# Patient Record
Sex: MALE | URBAN - METROPOLITAN AREA
[De-identification: names, ages, dates, MRNs, and addresses within clinical notes are randomized per-mention and may not be internally consistent; named-entity substitution may affect disease eponyms.]

---

## 2024-03-04 ENCOUNTER — TELEPHONE (OUTPATIENT)
Dept: NEUROLOGY | Facility: CLINIC | Age: 54
End: 2024-03-04

## 2024-03-04 NOTE — TELEPHONE ENCOUNTER
Patient is currently covered under   [employer]    ID # [id number] Effective 1/1/23    Patient is getting a referral to send to us from their primary doctor

## 2024-03-20 ENCOUNTER — TELEPHONE (OUTPATIENT)
Dept: NEUROLOGY | Facility: CLINIC | Age: 54
End: 2024-03-20

## 2024-03-22 NOTE — TELEPHONE ENCOUNTER
Recd  3/20 9:13 AM     I'm returning the call. my name is kathryn guzmán.  1970. And today I'm calling you because I'm returning your call. I couldn't answer the phone that quick.  Could you call me back [phone]

## 2024-03-25 ENCOUNTER — TELEPHONE (OUTPATIENT)
Dept: NEUROLOGY | Facility: CLINIC | Age: 54
End: 2024-03-25

## 2024-03-25 ENCOUNTER — OFFICE VISIT (OUTPATIENT)
Dept: NEUROLOGY | Facility: CLINIC | Age: 54
End: 2024-03-25
Payer: COMMERCIAL

## 2024-03-25 VITALS
OXYGEN SATURATION: 100 % | DIASTOLIC BLOOD PRESSURE: 80 MMHG | TEMPERATURE: 98.2 F | HEART RATE: 104 BPM | SYSTOLIC BLOOD PRESSURE: 124 MMHG

## 2024-03-25 DIAGNOSIS — G60.9 HEREDITARY PERIPHERAL NEUROPATHY: Primary | ICD-10-CM

## 2024-03-25 PROBLEM — G62.9 PERIPHERAL NEUROPATHY: Status: ACTIVE | Noted: 2024-03-25

## 2024-03-25 PROCEDURE — 99204 OFFICE O/P NEW MOD 45 MIN: CPT | Performed by: PSYCHIATRY & NEUROLOGY

## 2024-03-25 RX ORDER — APIXABAN 5 MG/1
5 TABLET, FILM COATED ORAL 2 TIMES DAILY
COMMUNITY

## 2024-03-25 RX ORDER — AMLODIPINE BESYLATE 5 MG/1
5 TABLET ORAL DAILY
COMMUNITY

## 2024-03-25 NOTE — PROGRESS NOTES
"Please note: this note was created with voice recognition software. Occasional wrong word or \"sound alike\" substitutions may occur due to the inherent limitations of voice recognition software. Read the chart carefully and recognize (using context) where substitutions may have occurred. I am available to discuss any questions.       {There are no diagnoses linked to this encounter. (Refresh or delete this SmartLink)}    Problem List Items Addressed This Visit    None      Problem List    None        I had the pleasure of seeing Dk Lau, a 53 y.o. male, for neuromuscular consultation. The referral was for ***.     ***      No past medical history on file.    No past surgical history on file.    Patient has no allergy information on record.    Social History     Tobacco Use   Smoking Status Not on file   Smokeless Tobacco Not on file       Social History     Substance and Sexual Activity   Alcohol Use Not on file       Social History     Substance and Sexual Activity   Drug Use Not on file       No family history on file.    No current outpatient medications on file.    No results found for any previous visit.        No results found for this or any previous visit.     No results found for this or any previous visit.    No results found for this or any previous visit.    No results found for this or any previous visit.    No results found for this or any previous visit.    No results found for this or any previous visit.    No results found for this or any previous visit.    No results found for this or any previous visit.    No results found for this or any previous visit.    No results found for this or any previous visit.    No results found for this or any previous visit.    No results found for this or any previous visit.      Review of Systems   Constitutional:  Negative for appetite change, fatigue and fever.   HENT: Negative.  Negative for hearing loss, tinnitus, trouble swallowing and voice change.  "   Eyes: Negative.  Negative for photophobia, pain and visual disturbance.   Respiratory: Negative.  Negative for shortness of breath.    Cardiovascular: Negative.  Negative for palpitations.   Gastrointestinal: Negative.  Negative for nausea and vomiting.   Endocrine: Negative.  Negative for cold intolerance.   Genitourinary: Negative.  Negative for dysuria, frequency and urgency.   Musculoskeletal:  Negative for back pain, gait problem, myalgias, neck pain and neck stiffness.   Skin: Negative.  Negative for rash.   Allergic/Immunologic: Negative.    Neurological:  Positive for weakness (weakness in hands/arms). Negative for dizziness, tremors, seizures, syncope, facial asymmetry, speech difficulty, light-headedness, numbness and headaches.   Hematological: Negative.  Does not bruise/bleed easily.   Psychiatric/Behavioral: Negative.  Negative for confusion, hallucinations and sleep disturbance.          On examination,     There were no vitals taken for this visit.    Well developed, well nourished, in no acute distress    Normocephalic, atraumatic    Heart: regular rate and rhythm  I did not hear a carotid bruit    Extremities: no clubbing, cyanosis, or edema    Speech and cognition appeared normal    Cranial nerves:  II: Pupils equal, round, and reactive to light. No gross visual field defect. I did not appreciate optic disc edema.  III, IV, VI: Extraocular movements intact  V: Normal facial sensation in all three divisions of the trigeminal nerve bilaterally  VII: Normal facial strength  VIII: Hearing intact to finger rub bilaterally  IX, X: Palate elevated symmetrically  XI: Sternocleidomastoid strength normal bilaterally  XII: Tongue protruded in midline without atrophy or fibrillations    Motor:  Normal tone and bulk throughout.   Muscle strength testing by the MRC scale was 5/5 in the deltoid, biceps, triceps, wrist extensors, wrist flexors, finger extensors, finger flexors,. Hip flexors, quadriceps, ankle  dorsiflexors, ankle plantar flexors, and EHL bilaterally    Deep tendon reflexes:   2+ and symmetrical in the biceps, triceps, brachioradialis, patellas, and ankles  Toes downgoing (no Babinski sign)  No Pantoja's sign    Sensation: Normal pinprick and light touch throughout    Cerebellar: normal finger to nose and heel to shin testing    Gait: Normal heel, toe, and tandem gait            There are no Patient Instructions on file for this visit.      Thank you very much for allowing me to participate in your patient's care. Please feel free to contact me for any questions or concerns. Please be aware of the inherent limitations of voice recognition software, which may result in transcriptional errors.    Percy Palacio MD

## 2024-03-25 NOTE — ASSESSMENT & PLAN NOTE
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  Genetic testing in the past was negative but does not exclude a point mutation, especially if his neuropathy is axonal (which testing has suggested).  No family members are available for examination and I told him and his caregiver I would be happy to see them at some point.  However, based on his description it sounds like his daughter, his mother, and a cousin are all affected.  The onset of his symptoms was in the teens with progressive weakness since then without sensory complaints.  This presentation is quite typical for a hereditary neuropathy.  Although his perception is that he improved when he received rituximab, this is based on the fact that he has progressed since stopping the medication.  No doctor has documented objective improvement despite 15 years of monthly IVIG, and despite other treatments including steroids and Rituxan.  I told him that I believe that the potential risks of treatment with immune modulatory or immunosuppressive treatment outweighs the potential benefits.  I am not entirely sure that he understood or agreed.  I certainly think he is a good candidate for physiatry evaluation given his chronic deficits and have given him a referral.  I will also write for a home physical therapy evaluation.

## 2024-03-25 NOTE — TELEPHONE ENCOUNTER
Wil  3/20 12:39 PM    [facility] [phone]  __________  patient already arrived for visit 3/25 with Dr. Palacio

## 2024-03-25 NOTE — PROGRESS NOTES
"Please note: this note was created with voice recognition software. Occasional wrong word or \"sound alike\" substitutions may occur due to the inherent limitations of voice recognition software. Read the chart carefully and recognize (using context) where substitutions may have occurred. I am available to discuss any questions.       1. 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  Genetic testing in the past was negative but does not exclude a point mutation, especially if his neuropathy is axonal (which testing has suggested).  No family members are available for examination and I told him and his caregiver I would be happy to see them at some point.  However, based on his description it sounds like his daughter, his mother, and a cousin are all affected.  The onset of his symptoms was in the teens with progressive weakness since then without sensory complaints.  This presentation is quite typical for a hereditary neuropathy.  Although his perception is that he improved when he received rituximab, this is based on the fact that he has progressed since stopping the medication.  No doctor has documented objective improvement despite 15 years of monthly IVIG, and despite other treatments including steroids and Rituxan.  I told him that I believe that the potential risks of treatment with immune modulatory or immunosuppressive treatment outweighs the potential benefits.  I am not entirely sure that he understood or agreed.  I certainly think he is a good candidate for physiatry evaluation given his chronic deficits and have given him a referral.  I will also write for a home physical therapy evaluation.    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  Genetic testing " in the past was negative but does not exclude a point mutation, especially if his neuropathy is axonal (which testing has suggested).  No family members are available for examination and I told him and his caregiver I would be happy to see them at some point.  However, based on his description it sounds like his daughter, his mother, and a cousin are all affected.  The onset of his symptoms was in the teens with progressive weakness since then without sensory complaints.  This presentation is quite typical for a hereditary neuropathy.  Although his perception is that he improved when he received rituximab, this is based on the fact that he has progressed since stopping the medication.  No doctor has documented objective improvement despite 15 years of monthly IVIG, and despite other treatments including steroids and Rituxan.  I told him that I believe that the potential risks of treatment with immune modulatory or immunosuppressive treatment outweighs the potential benefits.  I am not entirely sure that he understood or agreed.  I certainly think he is a good candidate for physiatry evaluation given his chronic deficits and have given him a referral.  I will also write for a home physical therapy evaluation.         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  Genetic testing in the past was negative but does not exclude a point mutation, especially if his neuropathy is axonal (which testing has suggested).  No family members are available for examination and I told him and his caregiver I would be happy to see them at some point.  However, based on his description it sounds like his daughter, his mother, and a cousin are all affected.  The onset of his symptoms was in the teens with progressive weakness since then without sensory complaints.   "This presentation is quite typical for a hereditary neuropathy.  Although his perception is that he improved when he received rituximab, this is based on the fact that he has progressed since stopping the medication.  No doctor has documented objective improvement despite 15 years of monthly IVIG, and despite other treatments including steroids and Rituxan.  I told him that I believe that the potential risks of treatment with immune modulatory or immunosuppressive treatment outweighs the potential benefits.  I am not entirely sure that he understood or agreed.  I certainly think he is a good candidate for physiatry evaluation given his chronic deficits and have given him a referral.  I will also write for a home physical therapy evaluation.              I had the pleasure of seeing Dk Lau, a 53 y.o. male, for neuromuscular consultation. The referral was for neuropathy.     He initially developed weakness in his hands in his teenage years. He had carpal tunnel surgery, without improvement. At age 20, he started falling and dropping to his knees. He then began to notice proximal leg weakness. He was initially diagnosed with CIDP in 2005 and was treated with monthly IVIg for 15 years, but has continued to progress. He received intermittent \"stem cell\" treatments in [place] but stopped in 2019 without improvement. He received rituximab and IV steroids from 2231-6282 and reported improvement after induction dose followed by 1000 mg every 6 months. He established care at [place] in July 2021. He has had progressive weakness and transitioned to a power wheelchair from a walker in May 2021     He had genetic panel with [facility] in 2021 that was negative. Previous work up [facility] includes EMG done 3/24 showing severe axonal neuropathy. EMG results done by Dr. Suárez 9/2021 not consistent with CIDP, rather severe polyneuropathy.  MRI of the entire neuroaxis in 7/2021 demonstrating \"diffuse atrophy, not " "expected with CIDP\" done at [place].  His most recent EMG was performed at [facility] which showed a severe axonal generalized neuropathy.  His last EMG at [place] was \"not consistent with CIDP\".  He cannot hold objects because his fingers \"curl up\".  He is interested in starting Rituxan because of his progression since he stopped this medicine.      He has a son who is 20 years old who is reported to be in normal health.  A daughter who is 28 has \"a little bit of problems\".  She has been dropping objects for 3 years and was never able to run track in school.  His mother has \"problems with her hands\" and drops things.  A cousin on that side is apparently affected and wears braces on his legs.    Mr. Lau is on Eliquis because of a recent DVT.  He discloses a history of asthma.  There is a history of carpal tunnel release as well as a remote muscle biopsy although those results are not available for my review.  There is no history of smoking or substance abuse.    No past medical history on file.    No past surgical history on file.    Patient has no known allergies.    Social History     Tobacco Use   Smoking Status Not on file   Smokeless Tobacco Not on file       Social History     Substance and Sexual Activity   Alcohol Use Not on file       Social History     Substance and Sexual Activity   Drug Use Not on file       No family history on file.      Current Outpatient Medications:     amLODIPine (NORVASC) 5 mg tablet, Take 5 mg by mouth daily, Disp: , Rfl:     Eliquis 5 MG, Take 5 mg by mouth 2 (two) times a day, Disp: , Rfl:     No results found for any previous visit.        No results found for this or any previous visit.     No results found for this or any previous visit.    No results found for this or any previous visit.    No results found for this or any previous visit.    No results found for this or any previous visit.    No results found for this or any previous visit.    No results found for " this or any previous visit.    No results found for this or any previous visit.    No results found for this or any previous visit.    No results found for this or any previous visit.    No results found for this or any previous visit.    No results found for this or any previous visit.    Review of Systems   Constitutional:  Negative for appetite change, fatigue and fever.   HENT: Negative.  Negative for hearing loss, tinnitus, trouble swallowing and voice change.    Eyes: Negative.  Negative for photophobia, pain and visual disturbance.   Respiratory: Negative.  Negative for shortness of breath.    Cardiovascular: Negative.  Negative for palpitations.   Gastrointestinal: Negative.  Negative for nausea and vomiting.   Endocrine: Negative.  Negative for cold intolerance.   Genitourinary: Negative.  Negative for dysuria, frequency and urgency.   Musculoskeletal:  Negative for back pain, gait problem, myalgias, neck pain and neck stiffness.   Skin: Negative.  Negative for rash.   Allergic/Immunologic: Negative.    Neurological:  Positive for weakness (weakness in hands/arms). Negative for dizziness, tremors, seizures, syncope, facial asymmetry, speech difficulty, light-headedness, numbness and headaches.   Hematological: Negative.  Does not bruise/bleed easily.   Psychiatric/Behavioral: Negative.  Negative for confusion, hallucinations and sleep disturbance.      On examination,     Blood pressure 124/80, pulse 104, temperature 98.2 °F (36.8 °C), temperature source Temporal, SpO2 100%.    Well developed, well nourished, in no acute distress    Normocephalic, atraumatic    Heart: regular rate and rhythm  I did not hear a carotid bruit    Extremities: no clubbing, cyanosis, or edema    Speech and cognition appeared normal    Cranial nerves:  II: Pupils equal, round, and reactive to light. No gross visual field defect. I did not appreciate optic disc edema.  III, IV, VI: Extraocular movements intact  V: Normal facial  sensation in all three divisions of the trigeminal nerve bilaterally  VII: Normal facial strength  VIII: Hearing intact to finger rub bilaterally  IX, X: Palate elevated symmetrically  XI: Sternocleidomastoid strength normal bilaterally  XII: Tongue protruded in midline without atrophy or fibrillations    Muscle testing by the MRC scale revealed:        Right   Left  Deltoid    2   3  Biceps    3   3  Triceps    4   4  Wrist flexors   2   1-  Wrist extensors  3-   3-  Finger flexors   3-   3-  Finger extensors  1   1  Interossei   1   1  Hip flexors   1   1  Knee extensors  1   1  Tibialis anterior  1   1  Plantar flexors   1   1      Deep tendon reflexes:   absent in the biceps, triceps, brachioradialis, patellas, and ankles  Toes downgoing (no Babinski sign)  No Pantoja's sign    Sensation: Normal subjective deficits    Cerebellar: no optic dysmetria, no cerebellar speech    Gait: wheelchair bound            There are no Patient Instructions on file for this visit.      Thank you very much for allowing me to participate in your patient's care. Please feel free to contact me for any questions or concerns. Please be aware of the inherent limitations of voice recognition software, which may result in transcriptional errors.    Percy Palacio MD

## 2024-03-27 ENCOUNTER — TELEPHONE (OUTPATIENT)
Dept: NEUROLOGY | Facility: CLINIC | Age: 54
End: 2024-03-27

## 2024-03-27 NOTE — TELEPHONE ENCOUNTER
Patient's caretaker called us to let us know that she is needing more PT time for Dk. Now he only receives it one day a week. She is also stating that they were expecting Dr. Palacio to start him on medications since he previously was on medication for his neuropathy . Caretaker will like a call back in regards to their concerns they will need a  CB# [phone]( Bradford)

## 2024-04-26 NOTE — TELEPHONE ENCOUNTER
"Returned Bradford (caregiver) with the assistance of the  line.     Per Bradford, PT in the home only lasts for 5 minutes once a week. The rest of the time they are talking and the therapist is putting things into the computer. Leahramila does not believe this is enough and would prefer to go an outpatient PT.     Caregiver is requesting a new referral for outpatient PT.    Patient has not seen Physiatry. Gave caregiver the main number [phone] to call for closest locations. Advised referral is in patient's chart.       Patient requests a follow up appointment to discuss treatment.   OV note by Dr Lind on 3-25-24, \"No doctor has documented objective improvement despite 15 years of monthly IVIG, and despite other treatments including steroids and Rituxan. I told him that I believe that the potential risks of treatment with immune modulatory or immunosuppressive treatment outweighs the potential benefits.\"     Dr Lind, please advise. Thank you!    "